# Patient Record
Sex: MALE | Race: BLACK OR AFRICAN AMERICAN | NOT HISPANIC OR LATINO | Employment: FULL TIME | URBAN - METROPOLITAN AREA
[De-identification: names, ages, dates, MRNs, and addresses within clinical notes are randomized per-mention and may not be internally consistent; named-entity substitution may affect disease eponyms.]

---

## 2018-04-30 ENCOUNTER — OFFICE VISIT - HEALTHEAST (OUTPATIENT)
Dept: FAMILY MEDICINE | Facility: CLINIC | Age: 38
End: 2018-04-30

## 2018-04-30 DIAGNOSIS — Z72.0 TOBACCO USE: ICD-10-CM

## 2018-04-30 DIAGNOSIS — G89.29 CHRONIC LOW BACK PAIN: ICD-10-CM

## 2018-04-30 DIAGNOSIS — F25.0 SCHIZOAFFECTIVE DISORDER, BIPOLAR TYPE (H): ICD-10-CM

## 2018-04-30 DIAGNOSIS — Z98.890 STATUS POST CARPAL TUNNEL RELEASE OF BOTH WRISTS: ICD-10-CM

## 2018-04-30 DIAGNOSIS — M54.50 CHRONIC LOW BACK PAIN: ICD-10-CM

## 2018-04-30 ASSESSMENT — MIFFLIN-ST. JEOR: SCORE: 2035.71

## 2018-05-29 ENCOUNTER — OFFICE VISIT - HEALTHEAST (OUTPATIENT)
Dept: FAMILY MEDICINE | Facility: CLINIC | Age: 38
End: 2018-05-29

## 2018-05-29 DIAGNOSIS — F25.0 SCHIZOAFFECTIVE DISORDER, BIPOLAR TYPE (H): ICD-10-CM

## 2018-05-29 DIAGNOSIS — M54.50 CHRONIC LOW BACK PAIN: ICD-10-CM

## 2018-05-29 DIAGNOSIS — G89.29 CHRONIC LOW BACK PAIN: ICD-10-CM

## 2018-05-29 LAB
BASOPHILS # BLD AUTO: 0 THOU/UL (ref 0–0.2)
BASOPHILS NFR BLD AUTO: 0 % (ref 0–2)
CARBAMAZEPINE SERPL-MCNC: 5.3 UG/ML (ref 4–12)
CHOLEST SERPL-MCNC: 206 MG/DL
EOSINOPHIL # BLD AUTO: 0.3 THOU/UL (ref 0–0.4)
EOSINOPHIL NFR BLD AUTO: 5 % (ref 0–6)
ERYTHROCYTE [DISTWIDTH] IN BLOOD BY AUTOMATED COUNT: 12.4 % (ref 11–14.5)
FASTING STATUS PATIENT QL REPORTED: NO
FASTING STATUS PATIENT QL REPORTED: NO
GLUCOSE BLD-MCNC: 108 MG/DL (ref 74–125)
HBA1C MFR BLD: 5.4 % (ref 3.5–6)
HCT VFR BLD AUTO: 44.3 % (ref 40–54)
HDLC SERPL-MCNC: 58 MG/DL
HGB BLD-MCNC: 14.9 G/DL (ref 14–18)
LDLC SERPL CALC-MCNC: 133 MG/DL
LEVETIRACETAM (KEPPRA): <2 UG/ML (ref 6–46)
LYMPHOCYTES # BLD AUTO: 2 THOU/UL (ref 0.8–4.4)
LYMPHOCYTES NFR BLD AUTO: 30 % (ref 20–40)
MCH RBC QN AUTO: 33.2 PG (ref 27–34)
MCHC RBC AUTO-ENTMCNC: 33.7 G/DL (ref 32–36)
MCV RBC AUTO: 98 FL (ref 80–100)
MONOCYTES # BLD AUTO: 0.3 THOU/UL (ref 0–0.9)
MONOCYTES NFR BLD AUTO: 5 % (ref 2–10)
NEUTROPHILS # BLD AUTO: 4.1 THOU/UL (ref 2–7.7)
NEUTROPHILS NFR BLD AUTO: 60 % (ref 50–70)
PLATELET # BLD AUTO: 228 THOU/UL (ref 140–440)
PMV BLD AUTO: 7.3 FL (ref 7–10)
RBC # BLD AUTO: 4.5 MILL/UL (ref 4.4–6.2)
TRIGL SERPL-MCNC: 73 MG/DL
WBC: 6.7 THOU/UL (ref 4–11)

## 2018-06-07 ENCOUNTER — OFFICE VISIT - HEALTHEAST (OUTPATIENT)
Dept: FAMILY MEDICINE | Facility: CLINIC | Age: 38
End: 2018-06-07

## 2018-06-07 DIAGNOSIS — K29.70 GASTRITIS: ICD-10-CM

## 2018-06-07 DIAGNOSIS — K60.2 ANAL FISSURE: ICD-10-CM

## 2018-06-07 DIAGNOSIS — M54.50 CHRONIC LOW BACK PAIN: ICD-10-CM

## 2018-06-07 DIAGNOSIS — G89.29 CHRONIC LOW BACK PAIN: ICD-10-CM

## 2018-06-07 RX ORDER — ACETAMINOPHEN 500 MG
TABLET ORAL
Qty: 80 TABLET | Refills: 1 | Status: SHIPPED | OUTPATIENT
Start: 2018-06-07 | End: 2021-07-20

## 2018-07-03 ENCOUNTER — COMMUNICATION - HEALTHEAST (OUTPATIENT)
Dept: FAMILY MEDICINE | Facility: CLINIC | Age: 38
End: 2018-07-03

## 2018-07-03 DIAGNOSIS — F25.0 SCHIZOAFFECTIVE DISORDER, BIPOLAR TYPE (H): ICD-10-CM

## 2018-07-05 RX ORDER — CARBAMAZEPINE 200 MG/1
TABLET ORAL
Qty: 30 TABLET | Refills: 1 | Status: SHIPPED | OUTPATIENT
Start: 2018-07-05

## 2018-07-16 ENCOUNTER — COMMUNICATION - HEALTHEAST (OUTPATIENT)
Dept: FAMILY MEDICINE | Facility: CLINIC | Age: 38
End: 2018-07-16

## 2018-07-16 DIAGNOSIS — G89.29 CHRONIC LOW BACK PAIN: ICD-10-CM

## 2018-07-16 DIAGNOSIS — M54.50 CHRONIC LOW BACK PAIN: ICD-10-CM

## 2018-08-07 ENCOUNTER — COMMUNICATION - HEALTHEAST (OUTPATIENT)
Dept: FAMILY MEDICINE | Facility: CLINIC | Age: 38
End: 2018-08-07

## 2018-08-07 DIAGNOSIS — F25.0 SCHIZOAFFECTIVE DISORDER, BIPOLAR TYPE (H): ICD-10-CM

## 2018-08-21 ENCOUNTER — OFFICE VISIT - HEALTHEAST (OUTPATIENT)
Dept: BEHAVIORAL HEALTH | Facility: CLINIC | Age: 38
End: 2018-08-21

## 2018-08-21 ENCOUNTER — AMBULATORY - HEALTHEAST (OUTPATIENT)
Dept: BEHAVIORAL HEALTH | Facility: CLINIC | Age: 38
End: 2018-08-21

## 2018-08-21 DIAGNOSIS — F25.0 SCHIZOAFFECTIVE DISORDER, BIPOLAR TYPE (H): ICD-10-CM

## 2018-08-31 ENCOUNTER — COMMUNICATION - HEALTHEAST (OUTPATIENT)
Dept: FAMILY MEDICINE | Facility: CLINIC | Age: 38
End: 2018-08-31

## 2018-08-31 DIAGNOSIS — F25.0 SCHIZOAFFECTIVE DISORDER, BIPOLAR TYPE (H): ICD-10-CM

## 2018-09-01 ENCOUNTER — COMMUNICATION - HEALTHEAST (OUTPATIENT)
Dept: FAMILY MEDICINE | Facility: CLINIC | Age: 38
End: 2018-09-01

## 2018-09-01 DIAGNOSIS — M54.50 CHRONIC LOW BACK PAIN: ICD-10-CM

## 2018-09-01 DIAGNOSIS — G89.29 CHRONIC LOW BACK PAIN: ICD-10-CM

## 2018-09-03 RX ORDER — ZIPRASIDONE HYDROCHLORIDE 40 MG/1
CAPSULE ORAL
Qty: 30 CAPSULE | Refills: 0 | Status: SHIPPED | OUTPATIENT
Start: 2018-09-03

## 2018-09-03 RX ORDER — ZIPRASIDONE HYDROCHLORIDE 80 MG/1
CAPSULE ORAL
Qty: 30 CAPSULE | Refills: 0 | Status: SHIPPED | OUTPATIENT
Start: 2018-09-03

## 2018-11-10 ENCOUNTER — COMMUNICATION - HEALTHEAST (OUTPATIENT)
Dept: FAMILY MEDICINE | Facility: CLINIC | Age: 38
End: 2018-11-10

## 2018-11-10 DIAGNOSIS — M54.50 CHRONIC LOW BACK PAIN: ICD-10-CM

## 2018-11-10 DIAGNOSIS — G89.29 CHRONIC LOW BACK PAIN: ICD-10-CM

## 2018-12-18 ENCOUNTER — COMMUNICATION - HEALTHEAST (OUTPATIENT)
Dept: FAMILY MEDICINE | Facility: CLINIC | Age: 38
End: 2018-12-18

## 2018-12-18 DIAGNOSIS — M54.50 CHRONIC LOW BACK PAIN: ICD-10-CM

## 2018-12-18 DIAGNOSIS — G89.29 CHRONIC LOW BACK PAIN: ICD-10-CM

## 2019-01-24 ENCOUNTER — COMMUNICATION - HEALTHEAST (OUTPATIENT)
Dept: FAMILY MEDICINE | Facility: CLINIC | Age: 39
End: 2019-01-24

## 2019-01-24 DIAGNOSIS — M54.50 CHRONIC LOW BACK PAIN: ICD-10-CM

## 2019-01-24 DIAGNOSIS — G89.29 CHRONIC LOW BACK PAIN: ICD-10-CM

## 2019-03-02 ENCOUNTER — COMMUNICATION - HEALTHEAST (OUTPATIENT)
Dept: FAMILY MEDICINE | Facility: CLINIC | Age: 39
End: 2019-03-02

## 2019-03-02 DIAGNOSIS — G89.29 CHRONIC LOW BACK PAIN: ICD-10-CM

## 2019-03-02 DIAGNOSIS — M54.50 CHRONIC LOW BACK PAIN: ICD-10-CM

## 2019-04-09 ENCOUNTER — COMMUNICATION - HEALTHEAST (OUTPATIENT)
Dept: FAMILY MEDICINE | Facility: CLINIC | Age: 39
End: 2019-04-09

## 2019-04-09 DIAGNOSIS — F25.0 SCHIZOAFFECTIVE DISORDER, BIPOLAR TYPE (H): ICD-10-CM

## 2019-04-10 RX ORDER — LEVETIRACETAM 500 MG/1
TABLET ORAL
Qty: 180 TABLET | Refills: 0 | Status: SHIPPED | OUTPATIENT
Start: 2019-04-10

## 2019-05-01 ENCOUNTER — COMMUNICATION - HEALTHEAST (OUTPATIENT)
Dept: FAMILY MEDICINE | Facility: CLINIC | Age: 39
End: 2019-05-01

## 2019-05-01 DIAGNOSIS — M54.50 CHRONIC LOW BACK PAIN: ICD-10-CM

## 2019-05-01 DIAGNOSIS — G89.29 CHRONIC LOW BACK PAIN: ICD-10-CM

## 2019-06-08 ENCOUNTER — COMMUNICATION - HEALTHEAST (OUTPATIENT)
Dept: FAMILY MEDICINE | Facility: CLINIC | Age: 39
End: 2019-06-08

## 2019-06-08 DIAGNOSIS — G89.29 CHRONIC LOW BACK PAIN: ICD-10-CM

## 2019-06-08 DIAGNOSIS — M54.50 CHRONIC LOW BACK PAIN: ICD-10-CM

## 2019-07-20 ENCOUNTER — COMMUNICATION - HEALTHEAST (OUTPATIENT)
Dept: FAMILY MEDICINE | Facility: CLINIC | Age: 39
End: 2019-07-20

## 2019-07-20 DIAGNOSIS — G89.29 CHRONIC LOW BACK PAIN: ICD-10-CM

## 2019-07-20 DIAGNOSIS — M54.50 CHRONIC LOW BACK PAIN: ICD-10-CM

## 2019-10-24 ENCOUNTER — OFFICE VISIT - HEALTHEAST (OUTPATIENT)
Dept: ADDICTION MEDICINE | Facility: CLINIC | Age: 39
End: 2019-10-24

## 2019-10-24 DIAGNOSIS — F12.10 CANNABIS USE DISORDER, MILD, ABUSE: ICD-10-CM

## 2019-10-24 DIAGNOSIS — F15.20 METHAMPHETAMINE USE DISORDER, MODERATE (H): ICD-10-CM

## 2019-11-05 ENCOUNTER — COMMUNICATION - HEALTHEAST (OUTPATIENT)
Dept: ADDICTION MEDICINE | Facility: CLINIC | Age: 39
End: 2019-11-05

## 2019-12-06 ENCOUNTER — RECORDS - HEALTHEAST (OUTPATIENT)
Dept: ADMINISTRATIVE | Facility: OTHER | Age: 39
End: 2019-12-06

## 2019-12-16 ENCOUNTER — OFFICE VISIT - HEALTHEAST (OUTPATIENT)
Dept: FAMILY MEDICINE | Facility: CLINIC | Age: 39
End: 2019-12-16

## 2019-12-16 DIAGNOSIS — S62.632S: ICD-10-CM

## 2019-12-16 DIAGNOSIS — S84.12XS INJURY OF LEFT PERONEAL NERVE, SEQUELA: ICD-10-CM
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
## 2019-12-16 RX ORDER — OXYCODONE AND ACETAMINOPHEN 5; 325 MG/1; MG/1
TABLET ORAL
Qty: 12 TABLET | Refills: 0 | Status: SHIPPED | OUTPATIENT
Start: 2019-12-16

## 2020-01-02 ENCOUNTER — COMMUNICATION - HEALTHEAST (OUTPATIENT)
Dept: FAMILY MEDICINE | Facility: CLINIC | Age: 40
End: 2020-01-02

## 2020-01-02 DIAGNOSIS — S62.632S: ICD-10-CM
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
## 2020-01-02 DIAGNOSIS — S84.12XS INJURY OF LEFT PERONEAL NERVE, SEQUELA: ICD-10-CM

## 2020-02-05 ENCOUNTER — COMMUNICATION - HEALTHEAST (OUTPATIENT)
Dept: FAMILY MEDICINE | Facility: CLINIC | Age: 40
End: 2020-02-05

## 2020-02-05 DIAGNOSIS — S62.632S: ICD-10-CM
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
## 2020-02-05 DIAGNOSIS — S84.12XS INJURY OF LEFT PERONEAL NERVE, SEQUELA: ICD-10-CM

## 2020-02-06 ENCOUNTER — COMMUNICATION - HEALTHEAST (OUTPATIENT)
Dept: FAMILY MEDICINE | Facility: CLINIC | Age: 40
End: 2020-02-06

## 2020-02-06 DIAGNOSIS — S84.12XS INJURY OF LEFT PERONEAL NERVE, SEQUELA: ICD-10-CM

## 2020-02-06 DIAGNOSIS — S62.632S: ICD-10-CM
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
## 2020-02-07 RX ORDER — IBUPROFEN 600 MG/1
TABLET, FILM COATED ORAL
Qty: 60 TABLET | Refills: 1 | Status: SHIPPED | OUTPATIENT
Start: 2020-02-07

## 2021-03-16 ENCOUNTER — RECORDS - HEALTHEAST (OUTPATIENT)
Dept: ADMINISTRATIVE | Facility: OTHER | Age: 41
End: 2021-03-16

## 2021-03-18 ENCOUNTER — RECORDS - HEALTHEAST (OUTPATIENT)
Dept: ADMINISTRATIVE | Facility: OTHER | Age: 41
End: 2021-03-18

## 2021-05-27 NOTE — TELEPHONE ENCOUNTER
Refill Approved    Rx renewed per Medication Renewal Policy. Medication was last renewed on 7/4/18.    Sharri Culp, [facility] Triage/Med Refill 4/10/2019     Requested Prescriptions   Pending Prescriptions Disp Refills     levETIRAcetam (KEPPRA) 500 MG tablet [Pharmacy Med Name: LEVETIRACETAM 500 MG TABLET] 180 tablet 2     Sig: TAKE 1 TABLET BY MOUTH TWICE A DAY       Gabapentin/Levetiracetam/Tiagabine Refill Protocol  Passed - 4/9/2019 10:02 PM        Passed - PCP or prescribing provider visit in past 12 months or next 3 months     Last office visit with prescriber/PCP: 6/7/2018 Gilberto Regan MD OR same dept: 6/7/2018 Gilberto Regan MD OR same specialty: 6/7/2018 Gilberto Regan MD  Last physical: Visit date not found Last MTM visit: Visit date not found   Next visit within 3 mo: Visit date not found  Next physical within 3 mo: Visit date not found  Prescriber OR PCP: Gilberto Regan MD  Last diagnosis associated with med order: 1. Schizoaffective disorder, bipolar type (H)  - levETIRAcetam (KEPPRA) 500 MG tablet [Pharmacy Med Name: LEVETIRACETAM 500 MG TABLET]; TAKE 1 TABLET BY MOUTH TWICE A DAY  Dispense: 180 tablet; Refill: 2    If protocol passes may refill for 12 months if within 3 months of last provider visit (or a total of 15 months).

## 2021-05-28 NOTE — TELEPHONE ENCOUNTER
RN cannot approve Refill Request    RN can NOT refill this medication med is not covered by policy/route to provider.    Angel Ayon, [facility] Triage/Med Refill 5/2/2019    Requested Prescriptions   Pending Prescriptions Disp Refills     naproxen (NAPROSYN) 500 MG tablet [Pharmacy Med Name: NAPROXEN 500 MG TABLET] 40 tablet 1     Sig: TAKE 1 TABLET BY MOUTH TWO TIMES A DAY AS NEEDED FOR PAIN       There is no refill protocol information for this order

## 2021-05-29 NOTE — TELEPHONE ENCOUNTER
RN cannot approve Refill Request    RN can NOT refill this medication med is not covered by policy/route to provider. Last office visit: 6/7/2018 Gilberto Regan MD Last Physical: Visit date not found Last MTM visit: Visit date not found Last visit same specialty: 6/7/2018 Gilberto Regan MD.  Next visit within 3 mo: Visit date not found  Next physical within 3 mo: Visit date not found      Lexii Yi, [facility] Triage/Med Refill 6/8/2019    Requested Prescriptions   Pending Prescriptions Disp Refills     naproxen (NAPROSYN) 500 MG tablet [Pharmacy Med Name: NAPROXEN 500 MG TABLET] 40 tablet 1     Sig: TAKE 1 TABLET BY MOUTH TWO TIMES A DAY AS NEEDED FOR PAIN       There is no refill protocol information for this order

## 2021-05-30 NOTE — TELEPHONE ENCOUNTER
RN cannot approve Refill Request    RN can NOT refill this medication med is not covered by policy/route to provider. Last office visit: 6/7/2018 Gilberto Regan MD Last Physical: Visit date not found Last MTM visit: Visit date not found Last visit same specialty: 6/7/2018 Gilberto Regan MD.  Next visit within 3 mo: Visit date not found  Next physical within 3 mo: Visit date not found      Lexii Yi, [facility] Triage/Med Refill 7/20/2019    Requested Prescriptions   Pending Prescriptions Disp Refills     naproxen (NAPROSYN) 500 MG tablet [Pharmacy Med Name: NAPROXEN 500 MG TABLET] 40 tablet 1     Sig: TAKE 1 TABLET BY MOUTH TWO TIMES A DAY AS NEEDED FOR PAIN       There is no refill protocol information for this order

## 2021-06-01 VITALS — BODY MASS INDEX: 36.8 KG/M2 | WEIGHT: 242 LBS

## 2021-06-01 VITALS — WEIGHT: 256 LBS | HEIGHT: 68 IN | BODY MASS INDEX: 38.8 KG/M2

## 2021-06-01 VITALS — WEIGHT: 246 LBS | BODY MASS INDEX: 37.4 KG/M2

## 2021-06-02 NOTE — PROGRESS NOTES
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 significant history related to substance use--Arrests? Convictions? Circumstances? Alcohol or drug involvement? Sentences? Still on probation or parole? Expectations of the court? Current court order? Any sex offenses - lifetime? What level? (DSM)    [facility] parole- gun charge, [facility]-3rd degree. Priors include possession of stolen property, assaults, violate OFP, burglary.    10. What obstacles exist to participating in treatment? (Time off work, childcare, funding, transportation, pending FDC time, living situation)    None    Dimension VI Ratings   Recovery environment - The placing authority must use the criteria in Dimension VI to determine a client s recovery environment.   RISK DESCRIPTIONS - Severity rating: 3  Client is not engaged in structured, meaningful activity and the client's peers, family , significant other, and living environment are unsupportive, or there is significant criminal justice system involvement.    REASONS SEVERITY WAS ASSIGNED - (What support does the person have for making changes? What structure/stability does the person have in his or her daily life that willincrease the likelihood that changes can be sustained? What problems exist in the person s environment that will jeopardize getting/staying clean and sober?)     Patient is employed. Patient has stable  housing. Patient is engaged ins structured daily activities. Patient reports positive support system. Patient is currently on parole in [facility]. Patient has prior legals.       Client Choice/Exceptions     Would you like services specific to language, age, gender, culture, Yazdanism preference, race, ethnicity, sexual orientation or disability?  no    If yes, specify:      What particular treatment choices and options would you like to have?  Outpatient    Do you have a preference for a particular treatment program?  [facility]      .    DSM-V Criteria for Substance Abuse  Instructions  Determine whether the client currently meets the criteria for a Substance Use Disorder using the diagnostic criteria in the DSM-V, pp. 481-589. Current means during the most recent 12 months outside a facility that controls access to substances.    Category of substance Severity ICD-10 Code/DSM V Code   []  Alcohol Use Disorder [] Mild  [] Moderate  [] Severe [] (F10.10) (305.00)  [] (F10.20) (303.90)  [] (F10.20) (303.90)   [x]  Cannabis Use Disorder [x] Mild  [] Moderate  [] Severe [x] (F12.10) (305.20)  [] (F12.20) (304.30)  [] (F12.20) (304.30)   [] Hallucinogen Use Disorder [] Mild  [] Moderate  [] Severe [] (F16.10) (305.30)  [] (F16.20) (304.50)  [] (F16.20) (304.50)   []  Inhalant Use Disorder [] Mild  [] Moderate  [] Severe [] (F18.10) (305.90)  [] (F18.20) (304.60)  [] (F18.20) (304.60)   []  Opioid Use Disorder [] Mild  [] Moderate  [] Severe [] (F11.10) (305.50)  [] (F11.20) (304.00)  [] (F11.20) (304.00)   []  Sedative, Hypnotic, or Anxiolytic Use Disorder [] Mild  [] Moderate  [] Severe [] (F13.10) (305.40)   [] (F13.20) (304.10)  [] (F13.20) (304.10)   [x]  Stimulant Related Disorders [] Mild  [x] Moderate  [] Severe [] (F15.10) (305.70) Amphetamine type substance  [] (F14.10) (305.60) Cocaine  [] (F15.10) (305.70) Other or unspecified stimulant  [x] (F15.20) (304.40) Amphetamine type substance  [] (F14.20)  (304.20) Cocaine  [] (F15.20) (304.40) Other or unspecified stimulant  [] (F15.20) (304.40) Amphetamine type substance  [] (F14.20) (304.20) Cocaine  [] (F15.20) (304.40) Other or unspecified stimulant   []  Tobacco use Disorder [] Mild  [] Moderate  [] Severe [] (Z72.0) (305.1)  [] (F17.200) (305.1)  [] (F17.200) (305.1)   []  Other (or unknown) Substance Use Disorder [] Mild  [] Moderate  [] Severe [] (F19.10) (305.90)  [] (F19.20) (304.90)  [] (F19.20) (304.90)       Suggested Level of Care Necessary for Recovery  []  Inpatient  []  Extended Care []  Residential [x]  Outpatient  []  None            Collateral Contact Summary   Number of contacts made:  2  Contact with referring person:  yes     If court related records were reviewed, summarize here:  NA     [x]   Information from collateral contacts supported/largely agreed with information from the client and associated risk ratings.   []   Information from collateral contacts was significantly different from information from the client and lead to different risk ratings.      Summarize new information here:      Rule 25 Assessment Summary and Plan   's Recommendation    Based on the information gathered in this assessment and from collateral information, the client meets DSM-V criteria for Stimulant Use Disorder, Moderate, (F15.20) (304.40) Amphetamine type substance and Cannabis Use Disorder, Mild, (F12.10) (305.20)    -Outpatient treatment program.  -Follow recommendations of treatment program.  -Abstain from use of mood altering substances not prescribed.  -Follow recommendations of parole.  -Remain law abiding.      This assessment can be updated with additional information within a 6 month period.      Collateral Contacts      Name    Andre Corbett Relationship    [facility] Phone Number    [phone] Releases    yes       Information Provided:      DAVID 10/31/19 @ 12:35PM  Received VM on 11/1/19 @ 2:02PM-He has been very active  with his use. Started out with meth and marijuana. States he has stopped using the meth but is using alcohol now. Behavior is extremely unpredictable which tells me he is still using the meth but I haven't been able to catch it on a drug test. It isn't like him to be this agitated going from 0 to 100. That's been going on for the last several weeks. He's been restructured to a hearing. I was recommending he go back to department of corrections to do CD treatment there but they directed him back to the community to get a chemical haelth assessment and follow the recommendations. At this time he is still using and I am pretty sure he is still using meth.     Collateral Contacts     Name    Daniela   Relationship    [facility] [place] [facility] Phone Number    [phone] Releases    yes       Information Provided:      He only comes here once a week for a hour for sex offender treatment. I know for sure in September he used meth, marijuana, and he had supposedly had a prescription for some pain killer-oxycodone. Since his release from residential he has also used alcohol. He admitted to it begrudgingly after a UA. I think his big struggles right now are anger, anger management, and anxiety. I think the anxiety is to root of it all. He is medication schizoaffective disorder. His auditory hallucinations are in check as long as he is medicated. He is so quick to anger. He does get depressed. He has a lot going on in his family-he has lots of family members that are ailing. This is a weird thing, he is normally consistent. He has been coming to therapy weekly and has been open to feedback. He stopped showing up around Mid-September and that is when this all started. I think he may have been selling meth but he won't talk to me about that yet.    Collateral Contacts     Name    Epic Chart Review   Relationship    NA Phone Number    NA Releases    NA       Information Provided:      Epic chart reviewed.    A problematic pattern of  alcohol/drug use leading to clinically significant impairment or distress, as manifested by at least two of the following, occurring within a 12-month period:      Specify if: In early remission:  After full criteria for alcohol/drug use disorder were previously met, none of the criteria for alcohol/drug use disorder have been met for at least 3 months but for less than 12 months (with the exception that Criterion A4,  Craving or a strong desire or urge to use alcohol/drug  may be met).     In sustained remission:   After full criteria for alcohol use disorder were previously met, none of the criteria for alcohol/drug use disorder have been met at any time during a period of 12 months or longer (with the exception that Criterion A4,  Craving or strong desire or urge to use alcohol/drug  may be met).   Specify if:   This additional specifier is used if the individual is in an environment where access to alcohol is restricted.    Mild: Presence of 2-3 symptoms  Moderate: Presence of 4-5 symptoms  Severe: Presence of 6 or more symptoms      Staff Name and Title: Jennifer Mazariegos  Date:  10/24/2019  Time:  10:31 AM

## 2021-06-04 VITALS
SYSTOLIC BLOOD PRESSURE: 122 MMHG | HEART RATE: 96 BPM | DIASTOLIC BLOOD PRESSURE: 82 MMHG | BODY MASS INDEX: 30.27 KG/M2 | WEIGHT: 205 LBS | RESPIRATION RATE: 16 BRPM

## 2021-06-04 NOTE — TELEPHONE ENCOUNTER
RN cannot approve Refill Request    RN can NOT refill this medication med is not covered by policy/route to provider. Last office visit: 12/16/2019 Gilberto Regan MD Last Physical: Visit date not found Last MTM visit: Visit date not found Last visit same specialty: 12/16/2019 Gilberto Regan MD.  Next visit within 3 mo: Visit date not found  Next physical within 3 mo: Visit date not found      Sarah Galvan, [facility] Triage/Med Refill 1/4/2020    Requested Prescriptions   Pending Prescriptions Disp Refills     ibuprofen (ADVIL,MOTRIN) 600 MG tablet [Pharmacy Med Name: IBUPROFEN 600 MG TABLET] 60 tablet 0     Sig: TAKE 1 TABLET BY MOUTH THREE TIMES A DAY AS NEEDED FOR PAIN       There is no refill protocol information for this order
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
## 2021-06-05 NOTE — TELEPHONE ENCOUNTER
RN cannot approve Refill Request    RN can NOT refill this medication med is not covered by policy/route to provider. Last office visit: 12/16/2019 Gilberto Regan MD Last Physical: Visit date not found Last MTM visit: Visit date not found Last visit same specialty: 12/16/2019 Gilberto Regan MD.  Next visit within 3 mo: Visit date not found  Next physical within 3 mo: Visit date not found      Lexii Yi, [facility] Triage/Med Refill 2/5/2020    Requested Prescriptions   Pending Prescriptions Disp Refills     ibuprofen (ADVIL,MOTRIN) 600 MG tablet [Pharmacy Med Name: IBUPROFEN 600 MG TABLET] 60 tablet 1     Sig: TAKE 1 TABLET BY MOUTH THREE TIMES A DAY AS NEEDED FOR PAIN       There is no refill protocol information for this order

## 2021-06-05 NOTE — TELEPHONE ENCOUNTER
RN cannot approve Refill Request    RN can NOT refill this medication med is not covered by policy/route to provider. Last office visit: 12/16/2019 Gilberto Regan MD Last Physical: Visit date not found Last MTM visit: Visit date not found Last visit same specialty: 12/16/2019 Gilberto Regan MD.  Next visit within 3 mo: Visit date not found  Next physical within 3 mo: Visit date not found      Lexii Yi, [facility] Triage/Med Refill 2/6/2020    Requested Prescriptions   Pending Prescriptions Disp Refills     ibuprofen (ADVIL,MOTRIN) 600 MG tablet [Pharmacy Med Name: IBUPROFEN 600 MG TABLET] 60 tablet 1     Sig: TAKE 1 TABLET BY MOUTH THREE TIMES A DAY AS NEEDED FOR PAIN       There is no refill protocol information for this order

## 2021-06-10 ENCOUNTER — OFFICE VISIT - HEALTHEAST (OUTPATIENT)
Dept: FAMILY MEDICINE | Facility: CLINIC | Age: 41
End: 2021-06-10

## 2021-06-10 DIAGNOSIS — S71.132S GUN SHOT WOUND OF THIGH/FEMUR, LEFT, SEQUELA: ICD-10-CM

## 2021-06-10 DIAGNOSIS — Z72.0 TOBACCO USE: ICD-10-CM

## 2021-06-10 DIAGNOSIS — F25.0 SCHIZOAFFECTIVE DISORDER, BIPOLAR TYPE (H): ICD-10-CM

## 2021-06-10 DIAGNOSIS — K43.9 VENTRAL HERNIA WITHOUT OBSTRUCTION OR GANGRENE: ICD-10-CM

## 2021-06-10 DIAGNOSIS — Z00.00 ROUTINE GENERAL MEDICAL EXAMINATION AT A HEALTH CARE FACILITY: ICD-10-CM

## 2021-06-10 DIAGNOSIS — F15.10 METHAMPHETAMINE ABUSE (H): ICD-10-CM

## 2021-06-10 LAB
ALBUMIN SERPL-MCNC: 3.7 G/DL (ref 3.5–5)
ALP SERPL-CCNC: 93 U/L (ref 45–120)
ALT SERPL W P-5'-P-CCNC: 36 U/L (ref 0–45)
ANION GAP SERPL CALCULATED.3IONS-SCNC: 11 MMOL/L (ref 5–18)
AST SERPL W P-5'-P-CCNC: 22 U/L (ref 0–40)
BILIRUB SERPL-MCNC: 0.4 MG/DL (ref 0–1)
BUN SERPL-MCNC: 11 MG/DL (ref 8–22)
CALCIUM SERPL-MCNC: 8.5 MG/DL (ref 8.5–10.5)
CHLORIDE BLD-SCNC: 107 MMOL/L (ref 98–107)
CHOLEST SERPL-MCNC: 195 MG/DL
CO2 SERPL-SCNC: 22 MMOL/L (ref 22–31)
CREAT SERPL-MCNC: 0.8 MG/DL (ref 0.7–1.3)
ERYTHROCYTE [DISTWIDTH] IN BLOOD BY AUTOMATED COUNT: 13.1 % (ref 11–14.5)
FASTING STATUS PATIENT QL REPORTED: YES
GFR SERPL CREATININE-BSD FRML MDRD: >60 ML/MIN/1.73M2
GLUCOSE BLD-MCNC: 95 MG/DL (ref 70–125)
HCT VFR BLD AUTO: 41.1 % (ref 40–54)
HDLC SERPL-MCNC: 73 MG/DL
HGB BLD-MCNC: 14.1 G/DL (ref 14–18)
LDLC SERPL CALC-MCNC: 109 MG/DL
MCH RBC QN AUTO: 31.5 PG (ref 27–34)
MCHC RBC AUTO-ENTMCNC: 34.3 G/DL (ref 32–36)
MCV RBC AUTO: 92 FL (ref 80–100)
PLATELET # BLD AUTO: 321 THOU/UL (ref 140–440)
PMV BLD AUTO: 8.2 FL (ref 7–10)
POTASSIUM BLD-SCNC: 4.1 MMOL/L (ref 3.5–5)
PROT SERPL-MCNC: 6.7 G/DL (ref 6–8)
PSA SERPL-MCNC: 0.4 NG/ML (ref 0–2.5)
RBC # BLD AUTO: 4.48 MILL/UL (ref 4.4–6.2)
SODIUM SERPL-SCNC: 140 MMOL/L (ref 136–145)
TRIGL SERPL-MCNC: 63 MG/DL
WBC: 7.4 THOU/UL (ref 4–11)

## 2021-06-10 ASSESSMENT — MIFFLIN-ST. JEOR: SCORE: 1886.67

## 2021-06-15 ENCOUNTER — COMMUNICATION - HEALTHEAST (OUTPATIENT)
Dept: FAMILY MEDICINE | Facility: CLINIC | Age: 41
End: 2021-06-15

## 2021-06-16 PROBLEM — Z72.0 TOBACCO USE: Status: ACTIVE | Noted: 2018-04-30

## 2021-06-16 PROBLEM — F25.0 SCHIZOAFFECTIVE DISORDER, BIPOLAR TYPE (H): Status: ACTIVE | Noted: 2018-04-30

## 2021-06-16 PROBLEM — M54.50 CHRONIC LOW BACK PAIN: Status: ACTIVE | Noted: 2018-04-30

## 2021-06-16 PROBLEM — Z98.890 STATUS POST CARPAL TUNNEL RELEASE OF BOTH WRISTS: Status: ACTIVE | Noted: 2018-04-30

## 2021-06-16 PROBLEM — K60.2 ANAL FISSURE: Status: ACTIVE | Noted: 2018-06-07

## 2021-06-16 PROBLEM — S41.112A ARM LACERATION, LEFT, INITIAL ENCOUNTER: Status: ACTIVE | Noted: 2019-07-15

## 2021-06-16 PROBLEM — G89.29 CHRONIC LOW BACK PAIN: Status: ACTIVE | Noted: 2018-04-30

## 2021-06-16 PROBLEM — K29.70 GASTRITIS: Status: ACTIVE | Noted: 2018-06-07
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
## 2021-06-18 NOTE — PROGRESS NOTES
Subjective: Patient comes in for follow-up [facility].  Patient also was seen on 6/1/2018 for some abdominal pain labs showed BMP normal lipase normal LFTs normal CBC normal abdominal ultrasound was normal    Evaluation showed evidence of anal fissure there was some bright red blood on the tissue Hemoccult on the stool was negative.    He states he is about the same denies any significant itching    We discussed soaks    Also for the gastritis was placed on omeprazole.    Patient does not have any significant reflux    We discussed avoiding NSAIDs stopping naproxen use Tylenol for pain.    He denies any vomiting diarrhea fever.  Not overly constipated.    Tobacco status: He  reports that he has been smoking.  He has a 7.00 pack-year smoking history. He has never used smokeless tobacco.    Patient Active Problem List    Diagnosis Date Noted     Anal fissure 06/07/2018     Gastritis 06/07/2018     Schizoaffective disorder, bipolar type 04/30/2018     Tobacco use 04/30/2018     Chronic low back pain 04/30/2018     Status post carpal tunnel release of both wrists 04/30/2018       Current Outpatient Prescriptions   Medication Sig Dispense Refill     carBAMazepine (TEGRETOL) 200 mg tablet 3 po qd 90 tablet 0     levETIRAcetam (KEPPRA) 500 MG tablet Take 1 tablet (500 mg total) by mouth 2 (two) times a day. 60 tablet 0     ziprasidone (GEODON) 40 MG capsule 1 po qd  Take along with the 80 mg capsule 30 capsule 0     ziprasidone (GEODON) 80 MG capsule 1 po qd Take along with the 40 mg capsule 30 capsule 0     acetaminophen (TYLENOL EXTRA STRENGTH) 500 MG tablet 1-2 po three times a day prn pain 80 tablet 1     omeprazole (PRILOSEC) 20 MG capsule Take 1 capsule (20 mg total) by mouth daily before breakfast.  0     No current facility-administered medications for this visit.        ROS:   10 point review of systems negative other than as outlined above    Objective:    /68 (Patient  Site: Left Arm, Patient Position: Sitting, Cuff Size: Adult Regular)  Pulse 74  Temp 97.7  F (36.5  C) (Tympanic)   Wt (!) 242 lb (109.8 kg)  BMI 36.8 kg/m2  Body mass index is 36.8 kg/(m^2).      General appearance quiet    Vital signs are stable afebrile    Lungs are clear no rales or rhonchi, heart was regular S1-S2 rate in 70s    Neck negative no JVD oropharynx was clear no scleral icterus.    Abdomen was soft bowel sounds normal no guarding or rebound the upper abdominal symptoms have improved.  He does feel like he is a little better with bowel movement    This was prescribed at the emergency room.    Extremities without edema.    Review of rectal exam noted regarding anal fissure.       lab work as outlined above from 6/1/2018        Assessment:  1. Gastritis  omeprazole (PRILOSEC) 20 MG capsule   2. Anal fissure     3. Chronic low back pain  acetaminophen (TYLENOL EXTRA STRENGTH) 500 MG tablet   4. Tobacco abuse  Gastritis, has been on omeprazole refill given discussed avoiding spicy foods and caffeine, try to quit smoking.    Use Tylenol for low back pain and other musculoskeletal issues.    Anal fissure discussed warm soaks increased fruits vegetables water.    Follow-up if not improved.        Plan: As outlined above    This transcription uses voice recognition software, which may contain typographical errors.

## 2021-06-18 NOTE — PROGRESS NOTES
"Subjective: This patient comes in for follow-up he was seen back at [facility] in for 2018.  Saw me for 3018.  He has been on carbamazepine 200 mg 3 a day as well as Keppra 500 mg 1 twice daily and Geodon 800+400 daily he will be seeing psychologist here for diagnostic assessment and then go on to see a psychiatrist.    I am not sure exactly why he is on the Keppra he thinks it is for his back and hip.  I assume the carbamazepine is for mood stabilization.    Patient feels like he has got some low back pain and that his \"hip grinds \".  I did check straight leg raising was negative rotational pain was negative    His Tegreto level is 5.3, Keppra less than 2.0.    Additional labs the cholesterol 206 triglycerides 73 HDL 58  blood sugar looked okay.        Tobacco status: He  reports that he has been smoking.  He has a 7.00 pack-year smoking history. He has never used smokeless tobacco.    Patient Active Problem List    Diagnosis Date Noted     Schizoaffective disorder, bipolar type 04/30/2018     Tobacco use 04/30/2018     Chronic low back pain 04/30/2018     Status post carpal tunnel release of both wrists 04/30/2018       Current Outpatient Prescriptions   Medication Sig Dispense Refill     carBAMazepine (TEGRETOL) 200 mg tablet 3 po qd 90 tablet 0     levETIRAcetam (KEPPRA) 500 MG tablet Take 1 tablet (500 mg total) by mouth 2 (two) times a day. 60 tablet 0     ziprasidone (GEODON) 40 MG capsule 1 po qd  Take along with the 80 mg capsule 30 capsule 0     ziprasidone (GEODON) 80 MG capsule 1 po qd Take along with the 40 mg capsule 30 capsule 0     naproxen (NAPROSYN) 500 MG tablet 1 po two times a day prn pain 40 tablet 1     No current facility-administered medications for this visit.        ROS:   10 point review of systems negative other than as outlined above    Objective:    /68 (Patient Site: Right Arm, Patient Position: Sitting, Cuff Size: Adult Large)  Pulse 80  Resp 16  Wt (!) 246 lb (111.6 kg) "  SpO2 94%  BMI 37.4 kg/m2  Body mass index is 37.4 kg/(m^2).      General appearance no acute distress    Vital signs are stable    Lungs are clear no rales rhonchi heart regular S1-S2    O2 sat 94%    He has no rotational pain with the hip negative straight leg raising mild tenderness in the lower back to palpation    No rash.    Skin was normal.  No edema through the lower extremities.        Results for orders placed or performed in visit on 05/29/18   Levetiracetam [Keppra ]   Result Value Ref Range    Levetiracetam <2.0 (L) 6.0 - 46.0 ug/mL   Carbamazepine [Tegretol ]   Result Value Ref Range    Carbamazepine 5.3 4.0 - 12.0 ug/mL   Glucose   Result Value Ref Range    Glucose 108 74 - 125 mg/dL    Patient Fasting > 8hrs? No    Lipid Cascade RANDOM   Result Value Ref Range    Cholesterol 206 (H) <=199 mg/dL    Triglycerides 73 <=149 mg/dL    HDL Cholesterol 58 >=40 mg/dL    LDL Calculated 133 (H) <=129 mg/dL    Patient Fasting > 8hrs? No    Glycosylated Hemoglobin A1c   Result Value Ref Range    Hemoglobin A1c 5.4 3.5 - 6.0 %   HM1 (CBC with Diff)   Result Value Ref Range    WBC 6.7 4.0 - 11.0 thou/uL    RBC 4.50 4.40 - 6.20 mill/uL    Hemoglobin 14.9 14.0 - 18.0 g/dL    Hematocrit 44.3 40.0 - 54.0 %    MCV 98 80 - 100 fL    MCH 33.2 27.0 - 34.0 pg    MCHC 33.7 32.0 - 36.0 g/dL    RDW 12.4 11.0 - 14.5 %    Platelets 228 140 - 440 thou/uL    MPV 7.3 7.0 - 10.0 fL    Neutrophils % 60 50 - 70 %    Lymphocytes % 30 20 - 40 %    Monocytes % 5 2 - 10 %    Eosinophils % 5 0 - 6 %    Basophils % 0 0 - 2 %    Neutrophils Absolute 4.1 2.0 - 7.7 thou/uL    Lymphocytes Absolute 2.0 0.8 - 4.4 thou/uL    Monocytes Absolute 0.3 0.0 - 0.9 thou/uL    Eosinophils Absolute 0.3 0.0 - 0.4 thou/uL    Basophils Absolute 0.0 0.0 - 0.2 thou/uL       Assessment:  1. Schizoaffective disorder, bipolar type  Levetiracetam [Keppra ]    Carbamazepine [Tegretol ]    HM1 (CBC and Differential)    Glucose    Lipid Cascade RANDOM    Glycosylated  Hemoglobin A1c    HM1 (CBC with Diff)   2. Chronic low back pain  naproxen (NAPROSYN) 500 MG tablet     I like to did not change any of the medications for schizoaffective disorder including the Keppra.    We will add some naproxen 500 mg 1 twice daily as needed back pain.    Recheck in couple months    Await diagnostic assessment and referral to psychiatry        Plan: As outlined above  This transcription uses voice recognition software, which may contain typographical errors.

## 2021-06-20 NOTE — PROGRESS NOTES
PSYCHOTHERAPY DISCHARGE SUMMARY     Dates of service  ___08/21/18_________  to ____8/21/18_______ # Sessions completed: __1________      Diagnosis  at Intake: _______Schizoaffective Disorder, bipolar type_______________________________________________      WHODAS __14 or 29%_____    Level of Functioning  Personal:  4   Social:   3     Family:   3   Work/School:    2    Diagnosis at Discharge: _______Schizoaffective Disorder, bipolar type____________________________________________    WHODAS __14 or 29%_____    Level of Functioning  Personal:  4   Social:   3     Family:   3   Work/School:    2      Progress toward goal 1: ____Refer to psychiatry- scheduled for 8/30/18______________________________                    Unmet ?    Partially met ?   Met ?        Additional comments: __Patient presented for intake appointment only- he does not want to continue long-term care with this provider. Mental health symptoms were assessed and he was referred to psychiatry services. _____________Therefore, long-term goals were never established.______________________________________      Reason for discharge: Pt dropped out ?  Goals partially met ?  Goals met ?  Other: Not interested in psychotherapy- psychiatry only? ________    Prognosis at discharge:   Poor ?           Guarded ?  Good ?      Recommendations and referrals at discharge: ____Attend intake at [facility] on 8/30/18 to establish care for psychiatry medication management. Follow up with PCP as needed. Continue taking medications as prescribed. Re-establish care with psychotherapist if interested in therapy in the future.__________________________________________________________________         Other recommendations: ____Mental Health Targeted Case Management, [facility], Chemical Dependence support group, [facility]__________________________________________________________________      Provider Signature: _____Melissa Gamaliel LICSW____________________     Date:  _______08/21/2018____________
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
## 2021-06-23 NOTE — TELEPHONE ENCOUNTER
RN cannot approve Refill Request    RN can NOT refill this medication med is not covered by policy/route to provider. Last office visit: 6/7/2018 Gilberto Regan MD Last Physical: Visit date not found Last MTM visit: Visit date not found Last visit same specialty: 6/7/2018 Gilberto Regan MD.  Next visit within 3 mo: Visit date not found  Next physical within 3 mo: Visit date not found      Lexii Yi, [facility] Triage/Med Refill 1/24/2019    Requested Prescriptions   Pending Prescriptions Disp Refills     naproxen (NAPROSYN) 500 MG tablet [Pharmacy Med Name: NAPROXEN 500 MG TABLET] 40 tablet 1     Sig: TAKE 1 TABLET BY MOUTH TWO TIMES A DAY AS NEEDED FOR PAIN    There is no refill protocol information for this order

## 2021-06-24 NOTE — TELEPHONE ENCOUNTER
RN cannot approve Refill Request    RN can NOT refill this medication med is not covered by policy/route to provider. Last office visit: Visit date not found Last Physical: Visit date not found Last MTM visit: Visit date not found Last visit same specialty: 6/7/2018 Gilberto Regan MD.  Next visit within 3 mo: Visit date not found  Next physical within 3 mo: Visit date not found      Lexii Yi, [facility] Triage/Med Refill 3/2/2019    Requested Prescriptions   Pending Prescriptions Disp Refills     naproxen (NAPROSYN) 500 MG tablet [Pharmacy Med Name: NAPROXEN 500 MG TABLET] 40 tablet 1     Sig: TAKE 1 TABLET BY MOUTH TWO TIMES A DAY AS NEEDED FOR PAIN    There is no refill protocol information for this order

## 2021-06-25 NOTE — TELEPHONE ENCOUNTER
Called and spoke with patient relay to message below. Patient needs referral for dentist. Please advise.      ----- Message from Gilberto Regan MD sent at 6/13/2021  8:35 PM CDT -----  Please contact this patient, he is living at a rehab center at this time.    The phone number for the Center is [phone].  Please call this number and ask for this patient.    Let the patient know that the chest x-ray was normal    The blood tests all were normal including prostate cancer check as well as the liver kidney electrolytes and blood sugar.    Normal cholesterol at 195 and the bad cholesterol was 109 which is fine.  Also normal hemoglobin and white count and platelets.    All in all, good news

## 2021-06-25 NOTE — TELEPHONE ENCOUNTER
Have him call his insurance regarding a dentist.  We do not give referrals to the dentist,, we are not associated with any dentist.
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
## 2021-07-06 VITALS
HEART RATE: 80 BPM | DIASTOLIC BLOOD PRESSURE: 72 MMHG | HEIGHT: 69 IN | BODY MASS INDEX: 32.58 KG/M2 | SYSTOLIC BLOOD PRESSURE: 123 MMHG | WEIGHT: 220 LBS

## 2021-07-20 DIAGNOSIS — S84.12XS INJURY OF LEFT PERONEAL NERVE, SEQUELA: ICD-10-CM

## 2021-07-20 DIAGNOSIS — K29.70 GASTRITIS: ICD-10-CM

## 2021-07-20 DIAGNOSIS — N48.30 PRIAPISM: ICD-10-CM
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
## 2021-07-20 DIAGNOSIS — G89.29 CHRONIC LOW BACK PAIN: ICD-10-CM

## 2021-07-20 DIAGNOSIS — S62.632S: ICD-10-CM

## 2021-07-20 DIAGNOSIS — M54.50 CHRONIC LOW BACK PAIN: ICD-10-CM

## 2021-07-20 DIAGNOSIS — F25.0 SCHIZOAFFECTIVE DISORDER, BIPOLAR TYPE (H): ICD-10-CM

## 2021-07-24 RX ORDER — LEVETIRACETAM 500 MG/1
TABLET ORAL
Qty: 180 TABLET | Refills: 0 | OUTPATIENT
Start: 2021-07-24

## 2021-07-24 RX ORDER — ACETAMINOPHEN 500 MG
TABLET ORAL
Qty: 80 TABLET | Refills: 1 | Status: SHIPPED | OUTPATIENT
Start: 2021-07-24

## 2021-07-24 RX ORDER — CARBAMAZEPINE 200 MG/1
TABLET ORAL
Qty: 30 TABLET | Refills: 1 | OUTPATIENT
Start: 2021-07-24

## 2021-07-24 RX ORDER — IBUPROFEN 600 MG/1
TABLET, FILM COATED ORAL
Qty: 60 TABLET | Refills: 1 | OUTPATIENT
Start: 2021-07-24

## 2021-07-24 RX ORDER — ZIPRASIDONE HYDROCHLORIDE 80 MG/1
CAPSULE ORAL
Qty: 30 CAPSULE | Refills: 0 | OUTPATIENT
Start: 2021-07-24

## 2021-07-24 RX ORDER — ZIPRASIDONE HYDROCHLORIDE 40 MG/1
CAPSULE ORAL
Qty: 30 CAPSULE | Refills: 0 | OUTPATIENT
Start: 2021-07-24

## 2021-07-24 NOTE — TELEPHONE ENCOUNTER
"Routing refill request to provider for review/approval because:  A break in medication. Rxs have not been renewed in over 2 years.      Last office visit provider:  6/10/21     Requested Prescriptions   Pending Prescriptions Disp Refills     acetaminophen (TYLENOL) 500 MG tablet 80 tablet 1     Sig: [ACETAMINOPHEN (TYLENOL EXTRA STRENGTH) 500 MG TABLET] 1-2 po three times a day prn pain       Analgesics (Non-Narcotic Tylenol and ASA Only) Passed - 7/20/2021 11:55 AM        Passed - Recent (12 mo) or future (30 days) visit within the authorizing provider's specialty     Patient has had an office visit with the authorizing provider or a provider within the authorizing providers department within the previous 12 mos or has a future within next 30 days. See \"Patient Info\" tab in inbasket, or \"Choose Columns\" in Meds & Orders section of the refill encounter.              Passed - Patient is 7 months old or older     If patient is a peds patient of the age 7 mos -12 years, ok to refill using weight-based dosing.     If >3g daily and/or sig is not \"prn\", check for liver enzymes. If normal in the last year, ok to refill.  If not, refer to the provider.          Passed - Medication is active on med list           carBAMazepine (TEGRETOL) 200 MG tablet 30 tablet 1     Sig: [CARBAMAZEPINE (TEGRETOL) 200 MG TABLET] TAKE 3 TABS BY MOUTH DAILY       Anti-Seizure Meds Protocol  Failed - 7/20/2021 11:55 AM        Failed - Review Authorizing provider's last note.      Refer to last progress notes: confirm request is for original authorizing provider (cannot be through other providers).          Failed - Carbamazepine level within therapeutic range in last 26 months     No lab results found.    Carbamazepine level must be checked 2-4 weeks after dosage change.            Passed - Recent (12 mo) or future (30 days) visit within the authorizing provider's specialty     Patient has had an office visit with the authorizing provider or a " "provider within the authorizing providers department within the previous 12 mos or has a future within next 30 days. See \"Patient Info\" tab in inbasket, or \"Choose Columns\" in Meds & Orders section of the refill encounter.              Passed - Normal CBC on file in past 26 months     Recent Labs   Lab Test 06/10/21  1216   WBC 7.4   RBC 4.48   HGB 14.1   HCT 41.1                    Passed - Normal ALT or AST on file in past 26 months     Recent Labs   Lab Test 06/10/21  1216   ALT 36     Recent Labs   Lab Test 06/10/21  1216   AST 22             Passed - Normal platelet count on file in past 26 months     Recent Labs   Lab Test 06/10/21  1216                  Passed - Medication is active on med list           ibuprofen (ADVIL/MOTRIN) 600 MG tablet 60 tablet 1     Sig: [IBUPROFEN (ADVIL,MOTRIN) 600 MG TABLET] TAKE 1 TABLET BY MOUTH THREE TIMES A DAY AS NEEDED FOR PAIN       NSAID Medications Passed - 7/20/2021 11:55 AM        Passed - Blood pressure under 140/90 in past 12 months     BP Readings from Last 3 Encounters:   06/10/21 123/72   12/16/19 122/82                 Passed - Normal ALT on file in past 12 months     Recent Labs   Lab Test 06/10/21  1216   ALT 36             Passed - Normal AST on file in past 12 months     Recent Labs   Lab Test 06/10/21  1216   AST 22             Passed - Recent (12 mo) or future (30 days) visit within the authorizing provider's specialty     Patient has had an office visit with the authorizing provider or a provider within the authorizing providers department within the previous 12 mos or has a future within next 30 days. See \"Patient Info\" tab in inbasket, or \"Choose Columns\" in Meds & Orders section of the refill encounter.              Passed - Patient is age 6-64 years        Passed - Normal CBC on file in past 12 months     Recent Labs   Lab Test 06/10/21  1216   WBC 7.4   RBC 4.48   HGB 14.1   HCT 41.1                    Passed - Medication is active " "on med list        Passed - Normal serum creatinine on file in past 12 months     Recent Labs   Lab Test 06/10/21  1216   CR 0.80       Ok to refill medication if creatinine is low             levETIRAcetam (KEPPRA) 500 MG tablet 180 tablet 0     Sig: [LEVETIRACETAM (KEPPRA) 500 MG TABLET] TAKE 1 TABLET BY MOUTH TWICE A DAY       There is no refill protocol information for this order        omeprazole (PRILOSEC) 20 MG DR capsule 30 capsule 0     Sig: Take 1 capsule (20 mg) by mouth daily before breakfast       PPI Protocol Passed - 7/20/2021 11:55 AM        Passed - Not on Clopidogrel (unless Pantoprazole ordered)        Passed - No diagnosis of osteoporosis on record        Passed - Recent (12 mo) or future (30 days) visit within the authorizing provider's specialty     Patient has had an office visit with the authorizing provider or a provider within the authorizing providers department within the previous 12 mos or has a future within next 30 days. See \"Patient Info\" tab in inbasket, or \"Choose Columns\" in Meds & Orders section of the refill encounter.              Passed - Medication is active on med list        Passed - Patient is age 18 or older           ziprasidone (GEODON) 40 MG capsule 30 capsule 0     Sig: [ZIPRASIDONE (GEODON) 40 MG CAPSULE] TAKE 1 TABLET BY MOUTH EVERY DAY .TAKE ALONG WITH THE 80 MG CAPSULE       Antipsychotic Medications Failed - 7/20/2021 11:55 AM        Failed - Lipid panel on file within the past 12 months     Recent Labs   Lab Test 06/10/21  1216   CHOL 195   TRIG 63   HDL 73                  Failed - CBC on file in past 12 months     Recent Labs   Lab Test 06/10/21  1216   WBC 7.4   RBC 4.48   HGB 14.1   HCT 41.1                    Failed - Recent (6 mo) or future (30 days) visit within the authorizing provider's specialty     Patient had office visit in the last 6 months or has a visit in the next 30 days with authorizing provider or within the authorizing " "provider's specialty.  See \"Patient Info\" tab in inbasket, or \"Choose Columns\" in Meds & Orders section of the refill encounter.            Passed - Blood pressure under 140/90 in past 12 months     BP Readings from Last 3 Encounters:   06/10/21 123/72   12/16/19 122/82                 Passed - Patient is 12 years of age or older        Passed - Heart Rate on file within past 12 months     Pulse Readings from Last 3 Encounters:   06/10/21 80   12/16/19 96               Passed - A1c or Glucose on file in past 12 months     Recent Labs   Lab Test 06/10/21  1216 05/29/18  1024   GLC 95 108   A1C  --  5.4       Please review patients last 3 weights. If a weight gain of >10 lbs exists, you may refill the prescription once after instructing the patient to schedule an appointment within the next 30 days.    Wt Readings from Last 3 Encounters:   06/10/21 99.8 kg (220 lb)   12/16/19 93 kg (205 lb)   06/07/18 109.8 kg (242 lb)             Passed - Medication is active on med list           ziprasidone (GEODON) 80 MG capsule 30 capsule 0     Sig: [ZIPRASIDONE (GEODON) 80 MG CAPSULE] TAKE 1 TAB BY MOUTH DAILY. TAKE ALONG WITH THE 40 MG CAPSULE       Antipsychotic Medications Failed - 7/20/2021 11:55 AM        Failed - Lipid panel on file within the past 12 months     Recent Labs   Lab Test 06/10/21  1216   CHOL 195   TRIG 63   HDL 73                  Failed - CBC on file in past 12 months     Recent Labs   Lab Test 06/10/21  1216   WBC 7.4   RBC 4.48   HGB 14.1   HCT 41.1                    Failed - Recent (6 mo) or future (30 days) visit within the authorizing provider's specialty     Patient had office visit in the last 6 months or has a visit in the next 30 days with authorizing provider or within the authorizing provider's specialty.  See \"Patient Info\" tab in inbasket, or \"Choose Columns\" in Meds & Orders section of the refill encounter.            Passed - Blood pressure under 140/90 in past 12 months     BP " Readings from Last 3 Encounters:   06/10/21 123/72   12/16/19 122/82                 Passed - Patient is 12 years of age or older        Passed - Heart Rate on file within past 12 months     Pulse Readings from Last 3 Encounters:   06/10/21 80   12/16/19 96               Passed - A1c or Glucose on file in past 12 months     Recent Labs   Lab Test 06/10/21  1216 05/29/18  1024   GLC 95 108   A1C  --  5.4       Please review patients last 3 weights. If a weight gain of >10 lbs exists, you may refill the prescription once after instructing the patient to schedule an appointment within the next 30 days.    Wt Readings from Last 3 Encounters:   06/10/21 99.8 kg (220 lb)   12/16/19 93 kg (205 lb)   06/07/18 109.8 kg (242 lb)             Passed - Medication is active on med list             Gabriela Tovar RN 07/24/21 12:07 AM

## 2021-08-15 ENCOUNTER — HOSPITAL ENCOUNTER (EMERGENCY)
Facility: HOSPITAL | Age: 41
Discharge: HOME | End: 2021-08-15
Attending: EMERGENCY MEDICINE | Admitting: EMERGENCY MEDICINE
Payer: COMMERCIAL

## 2021-08-15 VITALS
WEIGHT: 236 LBS | OXYGEN SATURATION: 98 % | SYSTOLIC BLOOD PRESSURE: 128 MMHG | RESPIRATION RATE: 18 BRPM | HEART RATE: 76 BPM | DIASTOLIC BLOOD PRESSURE: 74 MMHG | BODY MASS INDEX: 34.95 KG/M2 | TEMPERATURE: 98.2 F

## 2021-08-15 DIAGNOSIS — S61.412A LACERATION OF LEFT HAND WITHOUT COMPLICATION, EXCLUDING FINGERS, INITIAL ENCOUNTER: ICD-10-CM

## 2021-08-15 PROCEDURE — 12002 RPR S/N/AX/GEN/TRNK2.6-7.5CM: CPT

## 2021-08-15 PROCEDURE — 99283 EMERGENCY DEPT VISIT LOW MDM: CPT

## 2021-08-15 ASSESSMENT — ENCOUNTER SYMPTOMS: WOUND: 1

## 2021-08-15 NOTE — Clinical Note
Phani Ambriz was seen and treated in our emergency department on 8/15/2021.  He may return to work on 08/16/2021.       If you have any questions or concerns, please don't hesitate to call.      Cassy Marsh MD

## 2021-08-16 NOTE — DISCHARGE INSTRUCTIONS
Please keep the wound clean washing hands with warm soap and water.  Pat dry and keep covered if you are going to be in areas where the wounds can be contaminated.  Do not submerge the wound in lake water until it is fully healed.  Monitor for signs of infection which include increased swelling, drainage, pain or systemic signs which is fever.  Follow-up for wound check with any concerns.  Sutures are absorbable and do not need to be removed.  If they are bothersome, you may have the skin sutures removed in 10 days.

## 2021-08-16 NOTE — ED PROVIDER NOTES
EMERGENCY DEPARTMENT ENCOUNTER      NAME: Phani Ambriz  AGE: 41 year old male  YOB: 1980  MRN: [id number]  EVALUATION DATE & TIME: 8/15/2021 10:11 PM    PCP: Gilberto Regan    ED PROVIDER: Cassy Marsh M.D.      Chief Complaint   Patient presents with     Hand Laceration         FINAL IMPRESSION:  1. Laceration of left hand without complication, excluding fingers, initial encounter        MEDICAL DECISION MAKING:    10:25 PM I met with the patient, obtained history, performed an initial exam, and discussed options and plan for diagnostics and treatment here in the ED.   Pertinent Labs & Imaging studies reviewed. (See chart for details)         Phani Ambriz is a 41 year oldmale who presents with laceration of his left hand at the base of the left thumb.  Fortunately all tendons appear intact.  He has full range of motion.  There are no foreign bodies.  Minimal contamination.  No indication for imaging at this time.  The wound will be cleaned and repaired.  Tetanus status is up-to-date.    The wound was cleaned and repaired with no complications.  We discussed daily wound care as well as warning signs and indications to return for wound check.  He understands these warning signs and all discharge instructions.  Injury did happen at work and he was given a work note.    =================================================================    HPI    Patient information was obtained from: Patient    Use of : N/A       Phani Ambriz is a 41 year old male with a history of schizoaffective disorder and methamphetamine intoxication who presents to this ED by walk in for evaluation of left hand laceration.    Patient reports that he cut his left hand with a  at 2115 and is able to wiggle his fingers. It was a fresh blade and states his last tetanus was here. He is right handed and denies any other injuries and notes he has some numbness due to his tight wrapping on his hand. Patient  denies any other complaints at this time.       REVIEW OF SYSTEMS   Review of Systems   Skin: Positive for wound (left hand laceration).   All other systems reviewed and are negative.       PAST MEDICAL HISTORY:  No past medical history on file.    PAST SURGICAL HISTORY:  No past surgical history on file.    CURRENT MEDICATIONS:    No current facility-administered medications for this encounter.    Current Outpatient Medications:      acetaminophen (TYLENOL) 500 MG tablet, [ACETAMINOPHEN (TYLENOL EXTRA STRENGTH) 500 MG TABLET] 1-2 po three times a day prn pain, Disp: 80 tablet, Rfl: 1     carBAMazepine (TEGRETOL) 200 mg tablet, [CARBAMAZEPINE (TEGRETOL) 200 MG TABLET] TAKE 3 TABS BY MOUTH DAILY, Disp: 30 tablet, Rfl: 1     ibuprofen (ADVIL,MOTRIN) 600 MG tablet, [IBUPROFEN (ADVIL,MOTRIN) 600 MG TABLET] TAKE 1 TABLET BY MOUTH THREE TIMES A DAY AS NEEDED FOR PAIN, Disp: 60 tablet, Rfl: 1     levETIRAcetam (KEPPRA) 500 MG tablet, [LEVETIRACETAM (KEPPRA) 500 MG TABLET] TAKE 1 TABLET BY MOUTH TWICE A DAY, Disp: 180 tablet, Rfl: 0     omeprazole (PRILOSEC) 20 MG capsule, [OMEPRAZOLE (PRILOSEC) 20 MG CAPSULE] Take 1 capsule (20 mg total) by mouth daily before breakfast., Disp: , Rfl: 0     oxyCODONE-acetaminophen (PERCOCET/ENDOCET) 5-325 mg per tablet, [OXYCODONE-ACETAMINOPHEN (PERCOCET/ENDOCET) 5-325 MG PER TABLET] 1 po two times a day prn pain, Disp: 12 tablet, Rfl: 0     pseudoephedrine (SUDAFED) 30 MG tablet, [PSEUDOEPHEDRINE (SUDAFED) 30 MG TABLET] Take 4 tablets (120 mg total) by mouth once as needed for congestion (Priapism)., Disp: 24 tablet, Rfl: 0     ziprasidone (GEODON) 40 MG capsule, [ZIPRASIDONE (GEODON) 40 MG CAPSULE] TAKE 1 TABLET BY MOUTH EVERY DAY .TAKE ALONG WITH THE 80 MG CAPSULE, Disp: 30 capsule, Rfl: 0     ziprasidone (GEODON) 80 MG capsule, [ZIPRASIDONE (GEODON) 80 MG CAPSULE] TAKE 1 TAB BY MOUTH DAILY. TAKE ALONG WITH THE 40 MG CAPSULE, Disp: 30 capsule, Rfl: 0    ALLERGIES:  Allergies   Allergen  Reactions     Bupropion Hives     Rash over the body       FAMILY HISTORY:  Family History   Problem Relation Age of Onset     Diabetes Sister        SOCIAL HISTORY:   Social History     Tobacco Use     Smoking status: Current Every Day Smoker     Packs/day: 0.50     Years: 14.00     Pack years: 7.00     Smokeless tobacco: Never Used   Substance Use Topics     Alcohol use: Not on file     Drug use: Not on file        PHYSICAL EXAM:    Vitals: /88   Pulse 80   Temp 98.1  F (36.7  C) (Temporal)   Resp 20   Wt 107 kg (236 lb)   SpO2 95%   BMI 34.95 kg/m     General:. Alert and interactive, comfortable appearing.  HENT: Oropharynx without erythema or exudates. MMM.  TMs clear bilaterally.  Eyes: Pupils mid-sized and equally reactive.   Neck: Full AROM.  No midline tenderness to palpation.  Cardiovascular: Regular rate and rhythm. Peripheral pulses 2+ bilaterally.  Chest/Pulmonary: Normal work of breathing. Lung sounds clear and equal throughout, no wheezes or crackles. No chest wall tenderness or deformities.  Abdomen: Soft, nondistended. Nontender without guarding or rebound.  Back/Spine: No CVA or midline tenderness.  Extremities: Normal ROM of all major joints. No lower extremity edema. 4 cm laceration to base of left thumb. No arterial damage, through muscle bed. Full ROM.  Skin: Warm and dry. Normal skin color.   Neuro: Speech clear. CNs grossly intact. Moves all extremities appropriately. Strength and sensation grossly intact to all extremities.   Psych: Normal affect/mood, cooperative, memory appropriate.     PROCEDURES:   PROCEDURE: Laceration Repair   INDICATIONS: Laceration   PROCEDURE PROVIDER: Cassy Marsh MD   SITE: Base of left thumb   TYPE/SIZE: simple, clean and no foreign body visualized  4 cm (total length)   FUNCTIONAL ASSESSMENT: Distal sensation, circulation and motor intact   MEDICATION: 10 cc of 1% Lidocaine with epinephrine   PREPARATION: scrubbing with Warm soapy water, Saline  irrigation    DEBRIDEMENT: no debridement   CLOSURE:  Wound was closed with 4 deep sutures interuppted with 4:0 chromic and 10 skin sutures with 4:0 chromic  Total number of sutures/staples placed: 4 deep. 10 skin.       I, Hernandez Liang, am serving as a scribe to document services personally performed by Dr. Cassy Marsh  based on my observation and the provider's statements to me. I, Cassy Marsh MD attest that Hernandez Liang is acting in a scribe capacity, has observed my performance of the services and has documented them in accordance with my direction.      Cassy Marsh M.D.  Emergency Medicine  [facility] EMERGENCY DEPARTMENT  [facility] [facility] [id number] [phone]  Dept: [phone]         Cassy Marsh MD  08/16/21 0022

## 2021-08-16 NOTE — ED TRIAGE NOTES
15 minutes ago, pt was cutting something at work when he accidentally cut his left palm with a . Pt states that it was deep and gushing blood. Pt was able to rinse out the wound, apply pressure, and wrapped it with coban. Bleeding in controlled so dressing remains in place.

## 2021-08-16 NOTE — ED NOTES
The left hand was sutured. The hand was bandaged. No bleeding from suture site. The patient has good flex/extension with CMS: pink, flex/ext, and normal sensitivity.

## 2023-11-01 ENCOUNTER — HOSPITAL ENCOUNTER (EMERGENCY)
Facility: HOSPITAL | Age: 43
Discharge: HOME | End: 2023-11-01
Attending: EMERGENCY MEDICINE | Admitting: EMERGENCY MEDICINE
Payer: COMMERCIAL

## 2023-11-01 ENCOUNTER — APPOINTMENT (OUTPATIENT)
Dept: RADIOLOGY | Facility: HOSPITAL | Age: 43
End: 2023-11-01
Attending: EMERGENCY MEDICINE
Payer: COMMERCIAL

## 2023-11-01 ENCOUNTER — TRANSFERRED RECORDS (OUTPATIENT)
Dept: HEALTH INFORMATION MANAGEMENT | Facility: CLINIC | Age: 43
End: 2023-11-01

## 2023-11-01 VITALS
BODY MASS INDEX: 35.22 KG/M2 | WEIGHT: 246 LBS | TEMPERATURE: 97.9 F | HEIGHT: 70 IN | DIASTOLIC BLOOD PRESSURE: 91 MMHG | SYSTOLIC BLOOD PRESSURE: 144 MMHG | OXYGEN SATURATION: 97 % | RESPIRATION RATE: 18 BRPM | HEART RATE: 76 BPM

## 2023-11-01 DIAGNOSIS — M79.642 PAIN OF LEFT HAND: ICD-10-CM

## 2023-11-01 LAB
ALBUMIN SERPL BCG-MCNC: 4 G/DL (ref 3.5–5.2)
ALP SERPL-CCNC: 84 U/L (ref 40–129)
ALT SERPL W P-5'-P-CCNC: 37 U/L (ref 0–70)
ANION GAP SERPL CALCULATED.3IONS-SCNC: 14 MMOL/L (ref 7–15)
AST SERPL W P-5'-P-CCNC: 36 U/L (ref 0–45)
BASOPHILS # BLD AUTO: 0 10E3/UL (ref 0–0.2)
BASOPHILS NFR BLD AUTO: 0 %
BILIRUB SERPL-MCNC: 0.3 MG/DL
BUN SERPL-MCNC: 14.2 MG/DL (ref 6–20)
CALCIUM SERPL-MCNC: 8.5 MG/DL (ref 8.6–10)
CHLORIDE SERPL-SCNC: 106 MMOL/L (ref 98–107)
CK SERPL-CCNC: 788 U/L (ref 39–308)
CREAT SERPL-MCNC: 0.94 MG/DL (ref 0.67–1.17)
CRP SERPL-MCNC: <3 MG/L
DEPRECATED HCO3 PLAS-SCNC: 20 MMOL/L (ref 22–29)
EGFRCR SERPLBLD CKD-EPI 2021: >90 ML/MIN/1.73M2
EOSINOPHIL # BLD AUTO: 0.1 10E3/UL (ref 0–0.7)
EOSINOPHIL NFR BLD AUTO: 1 %
ERYTHROCYTE [DISTWIDTH] IN BLOOD BY AUTOMATED COUNT: 12.6 % (ref 10–15)
ERYTHROCYTE [SEDIMENTATION RATE] IN BLOOD BY WESTERGREN METHOD: 8 MM/HR (ref 0–15)
ETHANOL SERPL-MCNC: <0.01 G/DL
GLUCOSE SERPL-MCNC: 104 MG/DL (ref 70–99)
HCT VFR BLD AUTO: 43.3 % (ref 40–53)
HGB BLD-MCNC: 15.7 G/DL (ref 13.3–17.7)
IMM GRANULOCYTES # BLD: 0 10E3/UL
IMM GRANULOCYTES NFR BLD: 0 %
LACTATE SERPL-SCNC: 1.6 MMOL/L (ref 0.7–2)
LYMPHOCYTES # BLD AUTO: 1.9 10E3/UL (ref 0.8–5.3)
LYMPHOCYTES NFR BLD AUTO: 21 %
MAGNESIUM SERPL-MCNC: 2.1 MG/DL (ref 1.7–2.3)
MCH RBC QN AUTO: 33.3 PG (ref 26.5–33)
MCHC RBC AUTO-ENTMCNC: 36.3 G/DL (ref 31.5–36.5)
MCV RBC AUTO: 92 FL (ref 78–100)
MONOCYTES # BLD AUTO: 0.6 10E3/UL (ref 0–1.3)
MONOCYTES NFR BLD AUTO: 6 %
NEUTROPHILS # BLD AUTO: 6.6 10E3/UL (ref 1.6–8.3)
NEUTROPHILS NFR BLD AUTO: 72 %
NRBC # BLD AUTO: 0 10E3/UL
NRBC BLD AUTO-RTO: 0 /100
PLATELET # BLD AUTO: 305 10E3/UL (ref 150–450)
POTASSIUM SERPL-SCNC: 4.1 MMOL/L (ref 3.4–5.3)
PROT SERPL-MCNC: 6.8 G/DL (ref 6.4–8.3)
RBC # BLD AUTO: 4.72 10E6/UL (ref 4.4–5.9)
SODIUM SERPL-SCNC: 140 MMOL/L (ref 135–145)
WBC # BLD AUTO: 9.2 10E3/UL (ref 4–11)

## 2023-11-01 PROCEDURE — 86140 C-REACTIVE PROTEIN: CPT | Performed by: EMERGENCY MEDICINE

## 2023-11-01 PROCEDURE — 99284 EMERGENCY DEPT VISIT MOD MDM: CPT | Mod: 25

## 2023-11-01 PROCEDURE — 85025 COMPLETE CBC W/AUTO DIFF WBC: CPT | Performed by: EMERGENCY MEDICINE

## 2023-11-01 PROCEDURE — 36415 COLL VENOUS BLD VENIPUNCTURE: CPT | Performed by: EMERGENCY MEDICINE

## 2023-11-01 PROCEDURE — 73090 X-RAY EXAM OF FOREARM: CPT | Mod: LT

## 2023-11-01 PROCEDURE — 73130 X-RAY EXAM OF HAND: CPT | Mod: LT

## 2023-11-01 PROCEDURE — 80053 COMPREHEN METABOLIC PANEL: CPT | Performed by: EMERGENCY MEDICINE

## 2023-11-01 PROCEDURE — 96375 TX/PRO/DX INJ NEW DRUG ADDON: CPT

## 2023-11-01 PROCEDURE — 82077 ASSAY SPEC XCP UR&BREATH IA: CPT | Performed by: EMERGENCY MEDICINE

## 2023-11-01 PROCEDURE — 83735 ASSAY OF MAGNESIUM: CPT | Performed by: EMERGENCY MEDICINE

## 2023-11-01 PROCEDURE — 258N000003 HC RX IP 258 OP 636: Performed by: EMERGENCY MEDICINE

## 2023-11-01 PROCEDURE — 250N000011 HC RX IP 250 OP 636: Mod: JZ | Performed by: EMERGENCY MEDICINE

## 2023-11-01 PROCEDURE — 96374 THER/PROPH/DIAG INJ IV PUSH: CPT

## 2023-11-01 PROCEDURE — 85652 RBC SED RATE AUTOMATED: CPT | Performed by: EMERGENCY MEDICINE

## 2023-11-01 PROCEDURE — 96376 TX/PRO/DX INJ SAME DRUG ADON: CPT

## 2023-11-01 PROCEDURE — 83605 ASSAY OF LACTIC ACID: CPT | Performed by: EMERGENCY MEDICINE

## 2023-11-01 PROCEDURE — 82550 ASSAY OF CK (CPK): CPT | Performed by: EMERGENCY MEDICINE

## 2023-11-01 RX ORDER — ONDANSETRON 2 MG/ML
4 INJECTION INTRAMUSCULAR; INTRAVENOUS ONCE
Status: COMPLETED | OUTPATIENT
Start: 2023-11-01 | End: 2023-11-01

## 2023-11-01 RX ORDER — LORAZEPAM 2 MG/ML
0.5 INJECTION INTRAMUSCULAR ONCE
Status: COMPLETED | OUTPATIENT
Start: 2023-11-01 | End: 2023-11-01

## 2023-11-01 RX ORDER — KETOROLAC TROMETHAMINE 15 MG/ML
15 INJECTION, SOLUTION INTRAMUSCULAR; INTRAVENOUS ONCE
Status: COMPLETED | OUTPATIENT
Start: 2023-11-01 | End: 2023-11-01

## 2023-11-01 RX ADMIN — HYDROMORPHONE HYDROCHLORIDE 0.5 MG: 1 INJECTION, SOLUTION INTRAMUSCULAR; INTRAVENOUS; SUBCUTANEOUS at 02:10

## 2023-11-01 RX ADMIN — LORAZEPAM 0.5 MG: 2 INJECTION INTRAMUSCULAR; INTRAVENOUS at 04:38

## 2023-11-01 RX ADMIN — KETOROLAC TROMETHAMINE 15 MG: 15 INJECTION, SOLUTION INTRAMUSCULAR; INTRAVENOUS at 05:05

## 2023-11-01 RX ADMIN — HYDROMORPHONE HYDROCHLORIDE 0.5 MG: 1 INJECTION, SOLUTION INTRAMUSCULAR; INTRAVENOUS; SUBCUTANEOUS at 03:17

## 2023-11-01 RX ADMIN — ONDANSETRON 4 MG: 2 INJECTION INTRAMUSCULAR; INTRAVENOUS at 02:10

## 2023-11-01 RX ADMIN — SODIUM CHLORIDE 1000 ML: 9 INJECTION, SOLUTION INTRAVENOUS at 02:13

## 2023-11-01 ASSESSMENT — ACTIVITIES OF DAILY LIVING (ADL)
ADLS_ACUITY_SCORE: 35
ADLS_ACUITY_SCORE: 35

## 2023-11-01 NOTE — DISCHARGE INSTRUCTIONS
As we discussed, it is very important that you go to the appointment with the hand surgeon today (11/1/23) at [facility] with Dr. Phani Mendiola at 12 PM today.  This is at the [place] office with address as above.  It is very important that you attend this appointment to see the expert to determine why you are having this pain.  Your x-rays as well as lab work are all normal and we do not know exactly what is causing your pain which is why you need to see the hand surgeon today.     Return to the ER for any new or worsening concerns.

## 2023-11-01 NOTE — ED PROVIDER NOTES
EMERGENCY DEPARTMENT ENCOUNTER      NAME: Phani Ambriz  AGE: 43 year old male  YOB: 1980  MRN: [id number]  EVALUATION DATE & TIME: 11/1/2023  1:04 AM    PCP: Gilberto Regan    ED PROVIDER: Lalita Thurman MD      Chief Complaint   Patient presents with    Hand Injury         FINAL IMPRESSION:  1. Pain of left hand          ED COURSE & MEDICAL DECISION MAKING:    Pertinent Labs & Imaging studies reviewed. (See chart for details)    1:31 AM I introduced myself to the patient, obtained patient history, performed a physical exam, and discussed plan for ED workup including potential diagnostic laboratory/imaging studies and interventions.  03:50 AM I spoke with Dr. Mendiola, [facility].    43 year old male with history of carpal tunnel release of both wrists, schizoaffective disorder, prior methamphetamine use which he denies currently, tobacco use who presents to this ED for evaluation of left hand injury.  Injury occurred 1 week ago and positive abrasion to the palmar aspect of the left hand at the base of the left fifth metatarsal area.  States is healed over and has not had any spreading erythema.  Was having some mild pain over the area but was still able to work.  He states then tonight he developed severe pain that radiates down to his left forearm area that was not controlled with medication at home and thus presented here.  On exam he does not have any focal neurologic deficit.  Does have what appears to be pain out of proportion to exam stating that any movement of the left hand fingers causes significant pain.  Is able to range the left wrist but is hesitant to do so as he is concerned about pain but is able to do so.  Normal range of motion of the left elbow and left shoulder.  Compartments are soft and compressible.  Has good radial pulses and distal capillary refill.  Has no sign of flexor tendon synovitis at this point with no significant finger swelling, the fingers are not held  in flexion, no specific tenderness along the flexor tendon sheath, he has pain with passive extension of all of the fingers but not specifically 1 focal finger which again does not fit the rules of flexor tendon synovitis.  Thus felt this was less likely.  Does not have any obvious signs or symptoms of infection either.  No surrounding erythema.  Do not see obvious swelling on my exam.  He feels there may be some slight swelling.  He is adamant that he did not use any pressure machinery in this area.  The wound is entirely healed over and scabbed and appears well-healed.  No drainage.  Considered bony fracture will obtain his x-rays of the left hand and left radius and ulna.  Also obtain laboratory studies to evaluate for any signs of infection however have low suspicion for this.  Did also consider compartment syndrome however he has good perfusion and compartments are soft and compressible and with a week out of injury this did seem less likely especially in the hand.  No obvious sign of necrotizing fasciitis or osteomyelitis.  No obvious sign of foreign body but this would show up on the x-ray likely as well.  He denies anything specifically getting in the wound previously and again this is well-healed at this point.  He was given a liter of IV fluids as well as 2 doses of 0.5 mg of IV Dilaudid with 4 mg of IV Zofran.  He had some improvement with this but was still significantly anxious with the pain and thus was given 0.5 mg of IV Ativan with some improvement.  Eventually was able to be given IV Toradol as well at the end of his stay which was long enough after his prior ibuprofen use.    Laboratory studies largely unrevealing.  CRP and sed rate as well as white blood cell count are all within normal limits making significant infection unlikely.  Magnesium is within normal limits.  Alcohol level is less than 0.01.  He denies any alcohol or drug use here.  CK slightly elevated at 788 however was hydrated here  and this is not significant rhabdomyolysis.  Again no signs of significant crush injury and states this was only for a few seconds and was not on his hand or arm for a long period of time.  CMP largely unremarkable.  Hemoglobin 15.7.  Lactic acid also within normal limits.    X-ray of the left hand reveals a tiny area of calcific irregularity along the volar aspect of the left fifth PIP joint however he does not have any tenderness here.  This is nonspecific.  Otherwise no fractures are seen.  No radiopaque foreign bodies.  No definite soft tissue swelling.  No degenerative changes noted.  Small area of focal soft tissue swelling along the radial aspect of the distal forearm on the x-ray of the radius and ulna.  No soft tissue gas visualized.  No evidence for fracture dislocation or osteomyelitis.  Do not visualize this area of swelling on his exam but this is the area he is pointing as to where his pain radiates into his arm which is also the opposite side of where his palmar injury was near the fifth metacarpal.    Difficult case as his clinical presentation was not exactly matching up with the reassuring findings.  Thus I called and spoke with [facility] Dr. Mendiola as discussed below.  At this point have not identified any fractures on x-ray.  His pain has improved here he is much more calm.    ED Course as of 11/01/23 0554   Wed Nov 01, 2023   7210 Spoke with Dr. Mendiola with [facility] about the patient's presentation.  He stated that it would be very unlikely that the patient would develop compartment syndrome a week out from an injury.  Also discussed his overall presentation including reassuring laboratory studies and the x-ray results.  He stated again that compartment syndrome would be very unlikely and that any emergent pathology would also be very unlikely with no sign of significant infection with normal inflammatory studies and no sign of infection on exam.   Patient had denied using any high-pressure injection machines at work.  He also denied any IV drug use or other injection in the area.  There was no long-term crush injury or extended time of this.  Orthopedics felt that emergent pathology would be very unlikely at this point with his work-up and did not recommend any further imaging including did not recommend any CT scan at this point.  Again has no sign of infection on exam and normal laboratory studies making necrotizing fasciitis very unlikely.  Orthopedics feels he can be discharged at this point and follow-up closely in their clinic at noon which is approximately 6 hours from now.  Dr. Phani Mendiola stated that he would have appointment for the patient at noon at the [facility] orthopedics clinic and that he should present there and he will further evaluate.  Patient was in agreement with this plan.  He was feeling improved on reevaluation.  He was given strict return precautions.  He was discharged with close outpatient follow-up later today with orthopedic hand surgery in stable condition.   Patient was in agreement with this plan.  Again given very strict return precautions.  He was discharged home in stable condition.      1:31 AM I met with the patient, obtained history, performed an initial exam, and discussed options and plan for diagnostics and treatment here in the ED.     At the conclusion of the encounter I discussed the results of all of the tests and the disposition. The questions were answered. The patient or family acknowledged understanding and was agreeable with the care plan.         Medical Decision Making    History:  Supplemental history from: Documented in chart, if applicable  External Record(s) reviewed: Documented in chart, if applicable. and Other: [facility] Records      Work Up:  Chart documentation includes differential considered and any EKGs or imaging independently interpreted by provider.  In additional to work up  documented, I considered the following work up: Documented in chart, if applicable.    External consultation:  Discussion of management with another provider: Documented in chart, if applicable and Orthopedics    Complicating factors:  Care impacted by chronic illness: N/A  Care affected by social determinants of health: Access to Medical Care    Disposition considerations: Discharge. No recommendations on prescription strength medication(s). See documentation for any additional details.      MEDICATIONS GIVEN IN THE EMERGENCY:  Medications   sodium chloride 0.9% BOLUS 1,000 mL (0 mLs Intravenous Stopped 11/1/23 0319)   HYDROmorphone (DILAUDID) injection 0.5 mg (0.5 mg Intravenous $Given 11/1/23 0210)   ondansetron (ZOFRAN) injection 4 mg (4 mg Intravenous $Given 11/1/23 0210)   HYDROmorphone (DILAUDID) injection 0.5 mg (0.5 mg Intravenous $Given 11/1/23 0317)   LORazepam (ATIVAN) injection 0.5 mg (0.5 mg Intravenous $Given 11/1/23 0438)   ketorolac (TORADOL) injection 15 mg (15 mg Intravenous $Given 11/1/23 0505)       NEW PRESCRIPTIONS STARTED AT TODAY'S ER VISIT  New Prescriptions    No medications on file          =================================================================    HPI    Patient information was obtained from: The patient    Use of : N/A       Phani Ambriz is a 43 year old male with history of carpal tunnel release of both wrists, schizoaffective disorder, prior methamphetamine use which he denies currently, tobacco use who presents to this ED for evaluation of hand injury.  Patient reports that a week ago he injured his left hand while at work.  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  He states it was only briefly under this and not a prolonged crush injury.  He states he was able to work the past 2 days with some mild pain but then woke in the middle of the night  tonight with excruciating pain in the left hand that radiates down into his forearm.  No redness, swelling, or warmth.  He took ibuprofen and aspirin for pain.  Last dose was at 11 PM.  Denies any numbness, tingling, or weakness.  States it is excruciating painful to move at all.  No fevers.  No other injury.  The area did not fall on his arm.  Denies using any high-pressure injection machines at work.  Denies any IV drug use or other injection in the area.  States it was just a small abrasion wound that healed.  This is a scab at this time.  He is right-hand dominant.  He is not on any anticoagulation.    Per [facility], Most recent Tdap immunization on 12/06/2019.      REVIEW OF SYSTEMS   Review of Systems   Pertinent positives and negatives are documented in the HPI. All other systems reviewed and are negative.      PAST MEDICAL HISTORY:  History reviewed. No pertinent past medical history.    PAST SURGICAL HISTORY:  History reviewed. No pertinent surgical history.        CURRENT MEDICATIONS:    acetaminophen (TYLENOL) 500 MG tablet  carBAMazepine (TEGRETOL) 200 mg tablet  ibuprofen (ADVIL,MOTRIN) 600 MG tablet  levETIRAcetam (KEPPRA) 500 MG tablet  omeprazole (PRILOSEC) 20 MG capsule  oxyCODONE-acetaminophen (PERCOCET/ENDOCET) 5-325 mg per tablet  pseudoephedrine (SUDAFED) 30 MG tablet  ziprasidone (GEODON) 40 MG capsule  ziprasidone (GEODON) 80 MG capsule        ALLERGIES:  Allergies   Allergen Reactions    Bupropion Hives     Rash over the body       FAMILY HISTORY:  Family History   Problem Relation Age of Onset    Diabetes Sister        SOCIAL HISTORY:   Social History     Socioeconomic History    Marital status: Single   Tobacco Use    Smoking status: Every Day     Packs/day: 0.50     Years: 14.00     Additional pack years: 0.00     Total pack years: 7.00     Types: Cigarettes    Smokeless tobacco: Never   Substance and Sexual Activity    Sexual activity: Yes     Partners: Female       VITALS:  BP (!) 144/91    "Pulse 76   Temp 97.9  F (36.6  C) (Temporal)   Resp 18   Ht 1.778 m (5' 10\")   Wt 111.6 kg (246 lb)   SpO2 97%   BMI 35.30 kg/m      PHYSICAL EXAM    Physical Exam  Constitutional: Well developed, Well nourished, appears painful, GCS 15  HENT: Normocephalic, Atraumatic, Bilateral external ears normal, Oropharynx normal, mucous membranes moist, Nose normal. Neck-  Normal range of motion, No tenderness, Supple, No stridor.    Eyes: PERRL, EOMI, Conjunctiva normal, No discharge.   Respiratory: Normal breath sounds, No respiratory distress, No wheezing or crackles, Speaks in full sentences easily.    Cardiovascular: Normal heart rate, Regular rhythm, No murmurs, No rubs, No gallops. 2+ radial pulses bilaterally  GI: Bowel sounds normal, Soft, No tenderness, No masses, No rebound or guarding.   Musculoskeletal: 2+ DP pulses. No notable lower extremity edema.  No cyanosis, No clubbing.  Healed over superficial abrasion on the palmar aspect of the left hand near the fifth metacarpal area.  Reports significant pain with any movement of the left hand and fingers.  Does have range of motion of the left wrist elbow and shoulder.  Has tenderness diffusely throughout the hand as well as into the left forearm but this is actually on the radial aspect of the volar forearm.  There is no obvious spreading erythema or obvious swelling on my exam.  Compartments are soft and compressible.  2+ radial pulses and less than 2-second capillary refill in the distal fingers no significant swelling of the fingers or sausage digits.  The fingers are not held in flexion.  He initially was reporting that he was having significantly elevated pain when trying to move the wrist or hand however on multiple reevaluations he is holding his wrist and hand in different positions and is able to move them.  No pain along the flexor tendon sheath of the hand and any fingers.  Able to flex and extend the fingers but just reports pain with this.  No " other bony tenderness or abnormalities of the remainder of the extremities including the right upper extremity.  Do not visualize any ecchymosis of the left upper extremity.  Sensation intact to light touch throughout the whole left upper extremity.  Integument: Warm, Dry, No erythema, No rash. No petechiae.    Neurologic: Alert & oriented x 3, 5/5 strength in all 4 extremities bilaterally. Sensation intact to light touch in all 4 extremities and the face bilaterally. No focal deficits noted. Normal gait.     Psychiatric: Anxious. Cooperative.      LAB:  All pertinent labs reviewed and interpreted.  Results for orders placed or performed during the hospital encounter of 11/01/23   XR Hand Left G/E 3 Views    Impression    IMPRESSION: Multiple left hand views. Tiny area of calcific irregularity along the volar aspect of the left fifth PIP joint. This is nonspecific. Clinical correlation recommended. Otherwise, No fractures are seen. No radiopaque foreign bodies. No   definite soft tissue swelling. No degenerative changes are noted.         Radius/Ulna XR,  PA &LAT, left    Impression    IMPRESSION: Small area of focal soft tissue swelling along the radial aspect of the distal forearm. No soft tissue gas visualized. No evidence for fracture, dislocation or osteomyelitis.   Comprehensive metabolic panel   Result Value Ref Range    Sodium 140 135 - 145 mmol/L    Potassium 4.1 3.4 - 5.3 mmol/L    Carbon Dioxide (CO2) 20 (L) 22 - 29 mmol/L    Anion Gap 14 7 - 15 mmol/L    Urea Nitrogen 14.2 6.0 - 20.0 mg/dL    Creatinine 0.94 0.67 - 1.17 mg/dL    GFR Estimate >90 >60 mL/min/1.73m2    Calcium 8.5 (L) 8.6 - 10.0 mg/dL    Chloride 106 98 - 107 mmol/L    Glucose 104 (H) 70 - 99 mg/dL    Alkaline Phosphatase 84 40 - 129 U/L    AST 36 0 - 45 U/L    ALT 37 0 - 70 U/L    Protein Total 6.8 6.4 - 8.3 g/dL    Albumin 4.0 3.5 - 5.2 g/dL    Bilirubin Total 0.3 <=1.2 mg/dL   Lactic acid whole blood   Result Value Ref Range    Lactic  Acid 1.6 0.7 - 2.0 mmol/L   Result Value Ref Range    CRP Inflammation <3.00 <5.00 mg/L   Erythrocyte sedimentation rate auto   Result Value Ref Range    Erythrocyte Sedimentation Rate 8 0 - 15 mm/hr   Result Value Ref Range     (H) 39 - 308 U/L   Result Value Ref Range    Magnesium 2.1 1.7 - 2.3 mg/dL   Alcohol level blood   Result Value Ref Range    Alcohol ethyl <0.01 <=0.01 g/dL   CBC with platelets and differential   Result Value Ref Range    WBC Count 9.2 4.0 - 11.0 10e3/uL    RBC Count 4.72 4.40 - 5.90 10e6/uL    Hemoglobin 15.7 13.3 - 17.7 g/dL    Hematocrit 43.3 40.0 - 53.0 %    MCV 92 78 - 100 fL    MCH 33.3 (H) 26.5 - 33.0 pg    MCHC 36.3 31.5 - 36.5 g/dL    RDW 12.6 10.0 - 15.0 %    Platelet Count 305 150 - 450 10e3/uL    % Neutrophils 72 %    % Lymphocytes 21 %    % Monocytes 6 %    % Eosinophils 1 %    % Basophils 0 %    % Immature Granulocytes 0 %    NRBCs per 100 WBC 0 <1 /100    Absolute Neutrophils 6.6 1.6 - 8.3 10e3/uL    Absolute Lymphocytes 1.9 0.8 - 5.3 10e3/uL    Absolute Monocytes 0.6 0.0 - 1.3 10e3/uL    Absolute Eosinophils 0.1 0.0 - 0.7 10e3/uL    Absolute Basophils 0.0 0.0 - 0.2 10e3/uL    Absolute Immature Granulocytes 0.0 <=0.4 10e3/uL    Absolute NRBCs 0.0 10e3/uL       RADIOLOGY:  Reviewed all pertinent imaging. Please see official radiology report.  Radius/Ulna XR,  PA &LAT, left   Final Result   IMPRESSION: Small area of focal soft tissue swelling along the radial aspect of the distal forearm. No soft tissue gas visualized. No evidence for fracture, dislocation or osteomyelitis.      XR Hand Left G/E 3 Views   Final Result   IMPRESSION: Multiple left hand views. Tiny area of calcific irregularity along the volar aspect of the left fifth PIP joint. This is nonspecific. Clinical correlation recommended. Otherwise, No fractures are seen. No radiopaque foreign bodies. No    definite soft tissue swelling. No degenerative changes are noted.                   [facility]  Documentation:   CMS Diagnoses:               I, Aj Michelle, am serving as a scribe to document services personally performed by Lalita Thurman MD based on my observation and the provider's statements to me. I, Lalita Thurman MD, attest that Aj Martinez is acting in a scribe capacity, has observed my performance of the services and has documented them in accordance with my direction.    Lalita Thurman MD  [facility] EMERGENCY DEPARTMENT  [address] [place] [id number] [phone]     Lalita Thurman MD  11/28/23 3928

## 2023-11-01 NOTE — ED NOTES
Pt drove here by himself. Writer had him call for a verified ride at discharge prior to giving him IV pain/antianxiety medication. Pt's SO stated she would come pick him up for discharge when ready.

## 2023-11-01 NOTE — ED TRIAGE NOTES
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 Pt worked the past two days but woke up in the middle of the night tonight with excruciating pain in his hand that radiates down his forearm. +CMS. Puncture wound noted to palm. No redness, swelling or warmth noted to forearm. Taking ibuprofen and aspirin for pain. Last dose yesterday at 1100     Triage Assessment (Adult)       Row Name 11/01/23 0100          Triage Assessment    Airway WDL WDL        Respiratory WDL    Respiratory WDL WDL        Peripheral/Neurovascular WDL    Peripheral Neurovascular WDL WDL

## 2023-11-15 ENCOUNTER — TRANSFERRED RECORDS (OUTPATIENT)
Dept: HEALTH INFORMATION MANAGEMENT | Facility: CLINIC | Age: 43
End: 2023-11-15
Payer: COMMERCIAL

## 2023-11-29 ENCOUNTER — TRANSFERRED RECORDS (OUTPATIENT)
Dept: HEALTH INFORMATION MANAGEMENT | Facility: CLINIC | Age: 43
End: 2023-11-29
Payer: COMMERCIAL